# Patient Record
Sex: FEMALE | Race: WHITE
[De-identification: names, ages, dates, MRNs, and addresses within clinical notes are randomized per-mention and may not be internally consistent; named-entity substitution may affect disease eponyms.]

---
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
## 2023-10-02 NOTE — ER DOCUMENT REPORT
ED Psych Disorder / Suicide





<PACO DYKES - Last Filed: 01/20/18 17:00>





<CATHY PRICE - Last Filed: 01/20/18 20:06>





- General


Chief Complaint: Psych Problem


Stated Complaint: ALTERED MENTAL STATUS


Time Seen by Provider: 01/20/18 14:16


Notes: 





Patient with a history of bipolar disorder was in school today, on a Saturday 

work day to make up for time lost in the snowstorms.  She was undergoing 

testing and was very stressed.  She began having jerking of her neck and head 

to the left side.  Family brought her here for evaluation.  This started about 

8 AM this morning and the patient arrived here at 1:30 PM and was continuing to 

have some symptoms, but have now stopped while they have been waiting to be 

seen.  No other symptoms or complaints.  Never had this before.


Patient is on clonidine and its dose was increased from 0.1-0.2 a couple of 

weeks ago.  She is also on Wellbutrin and Lamictal, the latter being increased 

slightly from 100 to 150 mg recently.  She is on birth control pills.  Does not 

take any other types of medications.





 (CATHY PRICE)





- Related Data


Allergies/Adverse Reactions: 


 





carbinoxamine [From [facility]] Allergy (Verified 01/20/18 13:41)


 


Penicillins Allergy (Verified 01/20/18 13:41)


 


pseudoephedrine [From [facility]] Allergy (Verified 01/20/18 13:41)


 











Past Medical History





- Social History


Smoking Status: Never Smoker


Chew tobacco use (# tins/day): No


Frequency of alcohol use: None


Drug Abuse: None


Family History: Reviewed & Not Pertinent


Patient has suicidal ideation: No


Patient has homicidal ideation: No


Psychiatric Medical History: Reports: Hx Bipolar Disorder


Past Surgical History: Reports: Hx Tonsillectomy





<CATHY PRICE - Last Filed: 01/20/18 20:06>





Review of Systems





<PACO DYKES - Last Filed: 01/20/18 17:00>





<CATHY PRICE - Last Filed: 01/20/18 20:06>





- Review of Systems


Notes: 





CONSTITUTIONAL :  Denies fever.


  


CARDIOVASCULAR:  Denies chest pain.





RESPIRATORY:  Denies cough, chest congestion, or shortness of breath.





GASTROINTESTINAL:  Denies abdominal pain or nausea, vomiting, or diarrhea.





GENITOURINARY:  Denies difficulty or painful urinating, urinary frequency, 

blood in urine.





 (CATHY PRICE)





Physical Exam





<PACO DYKES - Last Filed: 01/20/18 17:00>





- Vital signs


Interpretation: Normal





<CATHY PRICE - Last Filed: 01/20/18 20:06>





- Vital signs


Vitals: 


 











Temp Pulse Resp BP Pulse Ox


 


 98.5 F   82   14 L  137/84 H  99 


 


 01/20/18 13:45  01/20/18 13:45  01/20/18 13:45  01/20/18 13:45  01/20/18 13:45














- Notes


Notes: 





PHYSICAL EXAMINATION:





GENERAL: Well-appearing, no acute distress.





HEAD: Atraumatic, normocephalic.





NECK: Normal range of motion, supple.





LUNGS: Breath sounds clear and equal bilaterally.





HEART: Regular rate and rhythm without murmurs heard.





ABDOMEN: Soft, nontender.  No guarding or rebound or masses felt.





Psychiatric: No acute symptoms.  Cooperative and follows commands appropriately.

 (CATHY PRICE)





Course





- Laboratory


Result Diagrams: 


 01/20/18 14:52





 01/20/18 14:52





<PACO DYKES - Last Filed: 01/20/18 17:00>





- Laboratory


Result Diagrams: 


 01/20/18 14:52





 01/20/18 14:52





<CATHY PRICE - Last Filed: 01/20/18 20:06>





- Re-evaluation


Re-evalutation: 





01/20/18 20:05


Patient is asymptomatic at the time that I saw her and examined her.  

Additionally, patient is already been evaluated by mental health he do not feel 

she has a mental health condition that needs to be treated at this time.  They 

are recommending outpatient follow-up. (CATHY PRICE)





- Vital Signs


Vital signs: 


 











Temp Pulse Resp BP Pulse Ox


 


 97.5 F   71   20   110/53 L  99 


 


 01/20/18 17:12  01/20/18 17:12  01/20/18 17:12  01/20/18 17:12  01/20/18 17:12














- Laboratory


Laboratory results interpreted by me: 


 











  01/20/18 01/20/18 01/20/18





  14:52 14:52 16:08


 


WBC  11.9 H  


 


Absolute Neutrophils  8.8 H  


 


ALT   32 H 


 


Urine Blood    MODERATE H


 


Ur Leukocyte Esterase    LARGE H














Discharge





<PACO DYKES - Last Filed: 01/20/18 17:00>





<CATHY PRICE - Last Filed: 01/20/18 20:06>





- Discharge


Clinical Impression: 


Bipolar disorder, unspecified


Qualifiers:


 Active/Remission status: remission status unspecified Qualified Code(s): F31.9 

- Bipolar disorder, unspecified





Condition: Stable


Disposition: HOME, SELF-CARE


Additional Instructions: 


Bipolar Disorder





     Bipolar disorder is also called manic-depressive disorder. Depression 

alternates with brain hyperactivity called rodolfo.  Each phase lasts from 

several days to a few weeks.  We don't know exactly what causes bipolar disorder

, but it's treatable.


     During the "manic phase," you may feel elated and energetic.  You may have 

racing thoughts, rapid speech, increased activity, and grandiose ideas.  During 

this time, you may not realize how poor your judgement is.  Inappropriate 

spending, drug abuse, excessive alcohol use, marriage problems, and 

irresponsible sexual behavior are common during the manic phase.


     During the "depressive phase," you might feel depressed, guilty, worthless

, fatigued, and unable to concentrate.  You might have thoughts of suicide.


     Good treatments are available for bipolar disorder. Lithium is a classic 

drug for bipolar disorder, and is still often useful. If the manic phase is 

very mild, an antidepressant alone can be prescribed.  If the manic phase is 

very severe, an antipsychotic medicine (such as Haldol) may be needed. The 

treatment must be matched to your symptoms, so it's important to work closely 

with your psychiatric care provider.


     Contact your physician, the hospital emergency center, crisis line, or 

your counsellor if you are losing control or having self-destructive thoughts.





Please follow up with your outpatient mental health provider, [facility], in 3-5 

days.  


Forms:  Parent Work Note, Return to School


Referrals: 


[facility] [Outside] - Follow up in 3-5 days Recent PHQ 2/9 Score    PHQ 2:  PHQ 2 Score Adult PHQ 2 Score Adult PHQ 2 Interpretation Little interest or pleasure in activity?   10/2/2023  10:50 AM 0 No further screening needed 0       PHQ 9:       Health Maintenance Due   Topic Date Due   • Shingles Vaccine (1 of 2) Never done   • Diabetes Foot Exam  08/11/2021   • COVID-19 Vaccine (5 - Moderna risk series) 09/19/2022   • Medicare Advantage- Medicare Wellness Visit  01/01/2023   • Influenza Vaccine (1) 09/01/2023       Patient is due for topics listed above, he wishes to proceed with Immunization(s) Influenza, Diabetes Foot Exam and MWV (Medicare Wellness Visit), but is not proceeding with Immunization(s) COVID-19 and Shingles at this time. The following has occurred: Education provided for Immunization(s) COVID-19 and Shingles.